# Patient Record
Sex: FEMALE | Race: WHITE | NOT HISPANIC OR LATINO | Employment: OTHER | URBAN - METROPOLITAN AREA
[De-identification: names, ages, dates, MRNs, and addresses within clinical notes are randomized per-mention and may not be internally consistent; named-entity substitution may affect disease eponyms.]

---

## 2022-11-13 ENCOUNTER — HOSPITAL ENCOUNTER (EMERGENCY)
Facility: CLINIC | Age: 37
Discharge: HOME | End: 2022-11-13
Attending: EMERGENCY MEDICINE | Admitting: EMERGENCY MEDICINE
Payer: COMMERCIAL

## 2022-11-13 VITALS
BODY MASS INDEX: 24.48 KG/M2 | OXYGEN SATURATION: 98 % | TEMPERATURE: 98.3 F | SYSTOLIC BLOOD PRESSURE: 124 MMHG | HEART RATE: 92 BPM | RESPIRATION RATE: 18 BRPM | HEIGHT: 70 IN | WEIGHT: 171 LBS | DIASTOLIC BLOOD PRESSURE: 94 MMHG

## 2022-11-13 DIAGNOSIS — S51.012A ELBOW LACERATION, LEFT, INITIAL ENCOUNTER: ICD-10-CM

## 2022-11-13 PROCEDURE — 12002 RPR S/N/AX/GEN/TRNK2.6-7.5CM: CPT | Performed by: EMERGENCY MEDICINE

## 2022-11-13 PROCEDURE — 99282 EMERGENCY DEPT VISIT SF MDM: CPT | Mod: 25 | Performed by: EMERGENCY MEDICINE

## 2022-11-13 PROCEDURE — 99283 EMERGENCY DEPT VISIT LOW MDM: CPT | Mod: 25 | Performed by: EMERGENCY MEDICINE

## 2022-11-13 ASSESSMENT — ACTIVITIES OF DAILY LIVING (ADL): ADLS_ACUITY_SCORE: 33

## 2022-11-13 NOTE — ED TRIAGE NOTES
Slipped getting out of shower, fell onto right wrist and then landed on left elbow. Left elbow with laceration-covered with gauze     Triage Assessment     Row Name 11/13/22 1584       Triage Assessment (Adult)    Airway WDL WDL       Respiratory WDL    Respiratory WDL WDL       Skin Circulation/Temperature WDL    Skin Circulation/Temperature WDL WDL       Cardiac WDL    Cardiac WDL WDL       Peripheral/Neurovascular WDL    Peripheral Neurovascular WDL WDL       Cognitive/Neuro/Behavioral WDL    Cognitive/Neuro/Behavioral WDL WDL

## 2022-11-14 ASSESSMENT — ENCOUNTER SYMPTOMS
SHORTNESS OF BREATH: 0
FEVER: 0
WOUND: 1
ABDOMINAL PAIN: 0

## 2022-11-14 NOTE — ED PROVIDER NOTES
"  History     Chief Complaint   Patient presents with     Laceration     Slipped getting out of shower, fell onto right wrist and then landed on left elbow. Left elbow with laceration-covered with gauze     HPI  Kirstie Tellez is a 37 year old female who is right-hand dominant, with relatively recent left labrum repair surgery, presenting the emergency department with left elbow laceration after patient slipped getting out of the shower.  She fell onto the right wrist, and subsequently landed on her left elbow.  Has laceration measuring approximately 5 cm of the left elbow.  No pain with range of motion.  No underlying bony pains.  No numbness, or tingling of the left upper extremity.  Was mechanical type fall.  No injury elsewhere.  Immunizations including tetanus up-to-date.    Allergies:  Allergies   Allergen Reactions     Codeine        Problem List:    Patient Active Problem List    Diagnosis Date Noted     Gastroesophageal reflux disease without esophagitis 10/01/2016     Priority: Medium     Chronic rhinitis 10/01/2016     Priority: Medium        Past Medical History:    No past medical history on file.    Past Surgical History:    No past surgical history on file.    Family History:    No family history on file.    Social History:  Marital Status:   [2]  Social History     Tobacco Use     Smoking status: Every Day   Substance Use Topics     Alcohol use: Yes     Drug use: No        Medications:    No current outpatient medications on file.        Review of Systems   Constitutional: Negative for fever.   Respiratory: Negative for shortness of breath.    Cardiovascular: Negative for chest pain.   Gastrointestinal: Negative for abdominal pain.   Skin: Positive for wound.   All other systems reviewed and are negative.      Physical Exam   BP: (!) 135/109  Pulse: 88  Temp: 98.3  F (36.8  C)  Resp: 20  Height: 177.8 cm (5' 10\")  Weight: 77.6 kg (171 lb)  SpO2: 97 %      Physical Exam  BP (!) 124/94   " "Pulse 92   Temp 98.3  F (36.8  C) (Tympanic)   Resp 18   Ht 1.778 m (5' 10\")   Wt 77.6 kg (171 lb)   SpO2 98%   BMI 24.54 kg/m    General: alert and in no acute distress  Head: atraumatic, normocephalic  Abd: nondistended  Musculoskel/Extremities: Left upper extremity with approximately 5 cm laceration extending through the dermal layer.  Normal range of motion of the left upper extremity.  No underlying bony tenderness.  Does not extend into joint.  Skin: no rashes, no diaphoresis and skin color normal  Neuro: Patient awake, alert, oriented, speech is fluent, gait is normal  Psychiatric: affect/mood normal, cooperative, normal judgement/insight and memory intact      ED Course                 Procedures              Critical Care time:  none               No results found for this or any previous visit (from the past 24 hour(s)).    Medications - No data to display    Assessments & Plan (with Medical Decision Making)  37 year old female, right-hand-dominant, presenting the emergency department with concerns regarding left elbow laceration.  Patient with mechanical fall suffering 5 cm laceration.  No evidence of deeper space involvement, specifically with no joint involvement.  Wound was anesthetized with 0.5% bupivacaine without epinephrine.  This was cleansed, explored, and subsequently repaired with 4-0 absorbable suture.  Good wound approximation.  Wound care instructions discussed with instructions to return if any signs of infection develop.  Tetanus status reviewed and is up-to-date.     I have reviewed the nursing notes.    I have reviewed the findings, diagnosis, plan and need for follow up with the patient.       There are no discharge medications for this patient.      Final diagnoses:   Elbow laceration, left, initial encounter       11/13/2022   [facility] EMERGENCY DEPT     Dylan Bautista MD  11/14/22 0154    "

## 2022-11-14 NOTE — DISCHARGE INSTRUCTIONS
Follow-up as needed in clinic.    Stitches will dissolve on their own.    Be seen if any signs of infection develop such as redness, pus, spreading redness.